# Patient Record
Sex: FEMALE | Race: OTHER | Employment: OTHER | URBAN - METROPOLITAN AREA
[De-identification: names, ages, dates, MRNs, and addresses within clinical notes are randomized per-mention and may not be internally consistent; named-entity substitution may affect disease eponyms.]

---

## 2019-05-23 ENCOUNTER — NEW PATIENT COMPREHENSIVE (OUTPATIENT)
Dept: URBAN - METROPOLITAN AREA CLINIC 39 | Facility: CLINIC | Age: 64
End: 2019-05-23

## 2019-05-23 DIAGNOSIS — Z01.00: ICD-10-CM

## 2019-05-23 PROCEDURE — 92015 DETERMINE REFRACTIVE STATE: CPT

## 2019-05-23 PROCEDURE — 92004 COMPRE OPH EXAM NEW PT 1/>: CPT

## 2019-05-23 ASSESSMENT — TONOMETRY
OS_IOP_MMHG: 14
OD_IOP_MMHG: 14

## 2019-05-23 ASSESSMENT — VISUAL ACUITY
OD_SC: 20/25-1
OS_SC: J1
OD_SC: J1
OS_SC: 20/25

## 2019-11-22 NOTE — PATIENT DISCUSSION
*POAG, OU: INTRAOCULAR PRESSURE IS UNDER GOOD CONTROL - PAST HISTORY OF ELEVATED IOP. THINNING FOUND ON OCT. STRESSED COMPLIANCE WITH CONSISTENT DROP USE. STOP LATANOPROST &amp; START LUMIGAN TO HELP CONTROL IOP. RECOMMEND [place] [facility] [place] RE-EVALUATION &amp; POSSIBLE SURGERY. PATIENT MAY NEED MIBS/TRABECULECTOMY.

## 2019-11-22 NOTE — PATIENT DISCUSSION
New Prescription: Lumigan (bimatoprost): drops: 0.01% 1 drop at bedtime as directed into both eyes 11-

## 2019-11-22 NOTE — PATIENT DISCUSSION
EPIRETINAL MEMBRANE, OS: MINIMAL VISUAL SIGNIFICANCE TO THE PATIENT AT THIS TIME. FOLLOW WITH DR. Carrie Fischer AS DIRECTED.

## 2019-11-22 NOTE — PATIENT DISCUSSION
Epiretinal Membrane Counseling: The diagnosis and natural history of epiretinal membrane was discussed with the patient including the risk of progression with retinal traction resulting in visual distortion. Patient instructed on how to do [address] to check for distortion in vision, The patient is instructed to call back immediately if any vision changes, and keep follow up as scheduled.

## 2019-11-22 NOTE — PATIENT DISCUSSION
*CATARACTS, OU - SLOWLY BECOMING VISUALLY SIGNIFICANT BUT NOT BOTHERSOME TO PATIENT AT THIS TIME. SPEC RX OFFERED. RECOMMEND THAT PATIENT RETURN TO [facility] [place] FOR RE-EVALUATION &amp; POSSIBLE SURGERY. PATIENT MAY NEED MIBS/TRABECULECTOMY.

## 2022-06-30 ENCOUNTER — COMPREHENSIVE EXAM (OUTPATIENT)
Dept: URBAN - METROPOLITAN AREA CLINIC 39 | Facility: CLINIC | Age: 67
End: 2022-06-30

## 2022-06-30 DIAGNOSIS — H52.03: ICD-10-CM

## 2022-06-30 PROCEDURE — 92014 COMPRE OPH EXAM EST PT 1/>: CPT

## 2022-06-30 PROCEDURE — 92015 DETERMINE REFRACTIVE STATE: CPT

## 2022-06-30 ASSESSMENT — TONOMETRY
OS_IOP_MMHG: 19
OD_IOP_MMHG: 18

## 2022-06-30 ASSESSMENT — VISUAL ACUITY
OS_SC: 20/25+1
OD_SC: J2
OS_SC: J2
OD_SC: 20/25-2

## 2023-06-29 ENCOUNTER — COMPREHENSIVE EXAM (OUTPATIENT)
Dept: URBAN - METROPOLITAN AREA CLINIC 39 | Facility: CLINIC | Age: 68
End: 2023-06-29

## 2023-06-29 DIAGNOSIS — H26.493: ICD-10-CM

## 2023-06-29 DIAGNOSIS — H52.03: ICD-10-CM

## 2023-06-29 DIAGNOSIS — H04.123: ICD-10-CM

## 2023-06-29 PROCEDURE — 92014 COMPRE OPH EXAM EST PT 1/>: CPT

## 2023-06-29 PROCEDURE — 92015 DETERMINE REFRACTIVE STATE: CPT

## 2023-06-29 ASSESSMENT — VISUAL ACUITY
OU_CC: J10
OS_BAT: 20/40
OU_SC: J4
OS_SC: 20/30-1
OD_SC: J4
OD_SC: 20/40-1
OU_SC: 20/30
OS_SC: J4
OS_CC: J12
OD_CC: J10
OD_BAT: 20/50+2

## 2023-06-29 ASSESSMENT — TONOMETRY
OD_IOP_MMHG: 12
OS_IOP_MMHG: 11

## 2023-09-13 ENCOUNTER — CONSULTATION/EVALUATION (OUTPATIENT)
Dept: URBAN - METROPOLITAN AREA CLINIC 39 | Facility: CLINIC | Age: 68
End: 2023-09-13

## 2024-07-15 ENCOUNTER — SURGERY/PROCEDURE (OUTPATIENT)
Facility: LOCATION | Age: 69
End: 2024-07-15

## 2024-07-15 ENCOUNTER — CONSULTATION/EVALUATION (OUTPATIENT)
Dept: URBAN - METROPOLITAN AREA CLINIC 39 | Facility: CLINIC | Age: 69
End: 2024-07-15

## 2024-07-15 DIAGNOSIS — H26.493: ICD-10-CM

## 2024-07-15 DIAGNOSIS — H04.123: ICD-10-CM

## 2024-07-15 PROCEDURE — 99214 OFFICE O/P EST MOD 30 MIN: CPT | Mod: 57

## 2024-07-15 PROCEDURE — 6682150 YAG CAPSULOTOMY: Mod: 50

## 2024-07-15 ASSESSMENT — VISUAL ACUITY
OD_SC: J4
OS_SC: 20/30-2
OS_SC: J4
OD_SC: 20/40+2

## 2024-07-15 ASSESSMENT — TONOMETRY
OD_IOP_MMHG: 14
OS_IOP_MMHG: 14

## 2024-07-31 ENCOUNTER — POST-OP (OUTPATIENT)
Dept: URBAN - METROPOLITAN AREA CLINIC 43 | Facility: CLINIC | Age: 69
End: 2024-07-31

## 2024-07-31 DIAGNOSIS — Z98.890: ICD-10-CM

## 2024-07-31 PROCEDURE — 99024 POSTOP FOLLOW-UP VISIT: CPT

## 2024-07-31 ASSESSMENT — TONOMETRY
OD_IOP_MMHG: 18
OS_IOP_MMHG: 17

## 2024-07-31 ASSESSMENT — VISUAL ACUITY
OD_PH: 20/20-2 SLOWLY
OS_SC: J4-
OD_SC: J3

## 2025-04-14 ENCOUNTER — COMPREHENSIVE EXAM (OUTPATIENT)
Age: 70
End: 2025-04-14

## 2025-04-14 DIAGNOSIS — H52.03: ICD-10-CM

## 2025-04-14 DIAGNOSIS — H52.223: ICD-10-CM

## 2025-04-14 DIAGNOSIS — H04.123: ICD-10-CM

## 2025-04-14 DIAGNOSIS — E11.9: ICD-10-CM

## 2025-04-14 PROCEDURE — 92014 COMPRE OPH EXAM EST PT 1/>: CPT

## 2025-04-14 PROCEDURE — 92015 DETERMINE REFRACTIVE STATE: CPT

## 2025-04-14 RX ORDER — POLYETHYLENE GLYCOL 400 2.5 MG/ML: 1 SOLUTION/ DROPS OPHTHALMIC
